# Patient Record
Sex: FEMALE | Race: WHITE | NOT HISPANIC OR LATINO | URBAN - NONMETROPOLITAN AREA
[De-identification: names, ages, dates, MRNs, and addresses within clinical notes are randomized per-mention and may not be internally consistent; named-entity substitution may affect disease eponyms.]

---

## 2017-02-17 ENCOUNTER — OFFICE VISIT (OUTPATIENT)
Dept: RETAIL CLINIC | Facility: CLINIC | Age: 57
End: 2017-02-17

## 2017-02-17 VITALS
TEMPERATURE: 99 F | WEIGHT: 206 LBS | OXYGEN SATURATION: 99 % | HEIGHT: 66 IN | RESPIRATION RATE: 14 BRPM | HEART RATE: 74 BPM | BODY MASS INDEX: 33.11 KG/M2

## 2017-02-17 DIAGNOSIS — J01.00 ACUTE MAXILLARY SINUSITIS, RECURRENCE NOT SPECIFIED: Primary | ICD-10-CM

## 2017-02-17 DIAGNOSIS — J02.9 SORE THROAT: ICD-10-CM

## 2017-02-17 LAB
EXPIRATION DATE: NORMAL
INTERNAL CONTROL: NORMAL
Lab: NORMAL
S PYO AG THROAT QL: NEGATIVE

## 2017-02-17 PROCEDURE — 87880 STREP A ASSAY W/OPTIC: CPT | Performed by: NURSE PRACTITIONER

## 2017-02-17 PROCEDURE — 99213 OFFICE O/P EST LOW 20 MIN: CPT | Performed by: NURSE PRACTITIONER

## 2017-02-17 RX ORDER — LISINOPRIL 10 MG/1
10 TABLET ORAL DAILY
COMMUNITY

## 2017-02-17 RX ORDER — FLUTICASONE PROPIONATE 50 MCG
SPRAY, SUSPENSION (ML) NASAL
Qty: 1 EACH | Refills: 0 | Status: SHIPPED | OUTPATIENT
Start: 2017-02-17 | End: 2018-08-06

## 2017-02-17 RX ORDER — AMOXICILLIN AND CLAVULANATE POTASSIUM 875; 125 MG/1; MG/1
1 TABLET, FILM COATED ORAL 2 TIMES DAILY
Qty: 20 TABLET | Refills: 0 | Status: SHIPPED | OUTPATIENT
Start: 2017-02-17 | End: 2017-02-27

## 2017-02-17 NOTE — PROGRESS NOTES
HPI Comments: Fara presents to the clinic today c/o sore throat which started three to four days ago. Associated symptoms include sinus congestion/facial pain, headache and nausea. Several of her grandchildren have been diagnosed with strep. She has tried Ibuprofen  without adequate relief. Refer to ROS for additional information.    Sore Throat    The current episode started in the past 7 days. The problem has been gradually worsening. The maximum temperature recorded prior to her arrival was 100.4 - 100.9 F. The fever has been present for 1 to 2 days. Associated symptoms include congestion, coughing and ear pain. Pertinent negatives include no abdominal pain, shortness of breath, swollen glands or vomiting. She has had exposure to strep. She has tried NSAIDs for the symptoms. The treatment provided mild relief.      The following portions of the patient's history were reviewed and updated as appropriate: allergies, current medications, past family history, past medical history, past social history, past surgical history and problem list.    Review of Systems   Constitutional: Positive for appetite change, fatigue and fever.   HENT: Positive for congestion, ear pain, postnasal drip, rhinorrhea, sinus pressure and sore throat.    Eyes: Negative for discharge.   Respiratory: Positive for cough. Negative for chest tightness, shortness of breath and wheezing.    Cardiovascular: Negative for chest pain.   Gastrointestinal: Negative for abdominal pain, nausea and vomiting.   Musculoskeletal: Negative for neck stiffness.   Skin: Negative for color change and rash.   Hematological: Positive for adenopathy.   Psychiatric/Behavioral: Positive for sleep disturbance.   All other systems reviewed and are negative.    Physical Exam   Constitutional: She is oriented to person, place, and time. She appears well-developed and well-nourished. She appears ill. No distress.   HENT:   Head: Normocephalic.   Right Ear: Ear canal  normal. There is tenderness. Tympanic membrane is erythematous and bulging. Tympanic membrane is not scarred and not retracted.   Left Ear: Ear canal normal. There is tenderness. Tympanic membrane is erythematous and bulging. Tympanic membrane is not scarred and not retracted.   Nose: Mucosal edema and rhinorrhea present. No epistaxis. Right sinus exhibits maxillary sinus tenderness. Right sinus exhibits no frontal sinus tenderness. Left sinus exhibits maxillary sinus tenderness. Left sinus exhibits no frontal sinus tenderness.   Mouth/Throat: Uvula is midline and mucous membranes are normal. No uvula swelling. Oropharyngeal exudate (PND) and posterior oropharyngeal erythema present. No posterior oropharyngeal edema.   Eyes: Pupils are equal, round, and reactive to light. Right eye exhibits no discharge. Left eye exhibits no discharge.   Neck: Neck supple. No rigidity.   Cardiovascular: Normal rate, regular rhythm and normal heart sounds.    Pulmonary/Chest: Effort normal. No respiratory distress. She has no decreased breath sounds. She has no wheezes. She has no rhonchi. She has no rales.   Abdominal: Soft. Bowel sounds are normal. There is no tenderness. There is no rebound and no guarding.   Lymphadenopathy:     She has no cervical adenopathy.   Neurological: She is alert and oriented to person, place, and time.   Skin: Skin is warm and dry.   Psychiatric: She has a normal mood and affect. Her behavior is normal.   Vitals reviewed.    Assessment/Plan   Fara was seen today for sore throat.    Diagnoses and all orders for this visit:    Acute maxillary sinusitis, recurrence not specified  Comments:  Findings and recommendations discussed with Fara. Supportive care measures reviewed.   Orders:  -     amoxicillin-clavulanate (AUGMENTIN) 875-125 MG per tablet; Take 1 tablet by mouth 2 (Two) Times a Day for 10 days. Take with food  -     fluticasone (FLONASE) 50 MCG/ACT nasal spray; One spray in each nostril twice a  day    Sore throat  Comments:  Findings and recommendations discussed with Fara. Reviewed results of her Strep test which was negative. Supportive care measures reviewed.   Orders:  -     POC Rapid Strep A    Findings and recommendations discussed with Fara. Reviewed results of her Strep test which was negative. Supportive care measures reviewed. Encouraged Fara to seek further medical evaluation if symptoms worsen or do not improve within 48-72 hours.   Lab Results   Component Value Date    IRLANDARN Negative 02/17/2017

## 2017-02-17 NOTE — PATIENT INSTRUCTIONS
Sinusitis, Adult  Sinusitis is redness, soreness, and puffiness (inflammation) of the air pockets in the bones of your face (sinuses). The redness, soreness, and puffiness can cause air and mucus to get trapped in your sinuses. This can allow germs to grow and cause an infection.   HOME CARE   · Drink enough fluids to keep your pee (urine) clear or pale yellow.  · Use a humidifier in your home.  · Run a hot shower to create steam in the bathroom. Sit in the bathroom with the door closed. Breathe in the steam 3-4 times a day.  · Put a warm, moist washcloth on your face 3-4 times a day, or as told by your doctor.  · Use salt water sprays (saline sprays) to wet the thick fluid in your nose. This can help the sinuses drain.  · Only take medicine as told by your doctor.  GET HELP RIGHT AWAY IF:   · Your pain gets worse.  · You have very bad headaches.  · You are sick to your stomach (nauseous).  · You throw up (vomit).  · You are very sleepy (drowsy) all the time.  · Your face is puffy (swollen).  · Your vision changes.  · You have a stiff neck.  · You have trouble breathing.  MAKE SURE YOU:   · Understand these instructions.  · Will watch your condition.  · Will get help right away if you are not doing well or get worse.     This information is not intended to replace advice given to you by your health care provider. Make sure you discuss any questions you have with your health care provider.     Document Released: 06/05/2009 Document Revised: 01/08/2016 Document Reviewed: 10/12/2016  [employer] Interactive Patient Education ©2016 [employer]  Sore Throat  A sore throat is a painful, burning, sore, or scratchy feeling of the throat. There may be pain or tenderness when swallowing or talking. You may have other symptoms with a sore throat. These include coughing, sneezing, fever, or a swollen neck. A sore throat is often the first sign of another sickness. These sicknesses may include a cold, flu, strep throat, or an  infection called mono. Most sore throats go away without medical treatment.   HOME CARE   · Only take medicine as told by your doctor.  · Drink enough fluids to keep your pee (urine) clear or pale yellow.  · Rest as needed.  · Try using throat sprays, lozenges, or suck on hard candy (if older than 4 years or as told).  · Sip warm liquids, such as broth, herbal tea, or warm water with honey. Try sucking on frozen ice pops or drinking cold liquids.  · Rinse the mouth (gargle) with salt water. Mix 1 teaspoon salt with 8 ounces of water.  · Do not smoke. Avoid being around others when they are smoking.  · Put a humidifier in your bedroom at night to moisten the air. You can also turn on a hot shower and sit in the bathroom for 5-10 minutes. Be sure the bathroom door is closed.  GET HELP RIGHT AWAY IF:   · You have trouble breathing.  · You cannot swallow fluids, soft foods, or your spit (saliva).  · You have more puffiness (swelling) in the throat.  · Your sore throat does not get better in 7 days.  · You feel sick to your stomach (nauseous) and throw up (vomit).  · You have a fever or lasting symptoms for more than 2-3 days.  · You have a fever and your symptoms suddenly get worse.  MAKE SURE YOU:   · Understand these instructions.  · Will watch your condition.  · Will get help right away if you are not doing well or get worse.     This information is not intended to replace advice given to you by your health care provider. Make sure you discuss any questions you have with your health care provider.     Document Released: 09/26/2009 Document Revised: 09/11/2013 Document Reviewed: 10/07/2016  [employer] Interactive Patient Education ©2016 [employer]

## 2018-08-06 ENCOUNTER — APPOINTMENT (OUTPATIENT)
Dept: ULTRASOUND IMAGING | Facility: HOSPITAL | Age: 58
End: 2018-08-06

## 2018-08-06 ENCOUNTER — HOSPITAL ENCOUNTER (EMERGENCY)
Facility: HOSPITAL | Age: 58
Discharge: HOME | End: 2018-08-06
Attending: EMERGENCY MEDICINE | Admitting: EMERGENCY MEDICINE

## 2018-08-06 VITALS
OXYGEN SATURATION: 98 % | RESPIRATION RATE: 18 BRPM | BODY MASS INDEX: 29.73 KG/M2 | WEIGHT: 185 LBS | TEMPERATURE: 98.4 F | HEIGHT: 66 IN | HEART RATE: 70 BPM | SYSTOLIC BLOOD PRESSURE: 95 MMHG | DIASTOLIC BLOOD PRESSURE: 61 MMHG

## 2018-08-06 DIAGNOSIS — L03.116 CELLULITIS OF LEFT LOWER LEG: Primary | ICD-10-CM

## 2018-08-06 LAB
ALBUMIN SERPL-MCNC: 4.2 G/DL (ref 3.5–5)
ALBUMIN/GLOB SERPL: 1 G/DL (ref 1.5–2.5)
ALP SERPL-CCNC: 94 U/L (ref 35–104)
ALT SERPL W P-5'-P-CCNC: 28 U/L (ref 10–36)
ANION GAP SERPL CALCULATED.3IONS-SCNC: 9.1 MMOL/L (ref 3.6–11.2)
APTT PPP: 38.4 SECONDS (ref 23.8–36.1)
AST SERPL-CCNC: 29 U/L (ref 10–30)
BASOPHILS # BLD AUTO: 0.01 10*3/MM3 (ref 0–0.3)
BASOPHILS NFR BLD AUTO: 0.2 % (ref 0–2)
BILIRUB SERPL-MCNC: 0.8 MG/DL (ref 0.2–1.8)
BUN BLD-MCNC: 24 MG/DL (ref 7–21)
BUN/CREAT SERPL: 18.9 (ref 7–25)
CALCIUM SPEC-SCNC: 9.6 MG/DL (ref 7.7–10)
CHLORIDE SERPL-SCNC: 99 MMOL/L (ref 99–112)
CO2 SERPL-SCNC: 27.9 MMOL/L (ref 24.3–31.9)
CREAT BLD-MCNC: 1.27 MG/DL (ref 0.43–1.29)
CRP SERPL-MCNC: 3.18 MG/DL (ref 0–0.99)
DEPRECATED RDW RBC AUTO: 42.2 FL (ref 37–54)
EOSINOPHIL # BLD AUTO: 0.06 10*3/MM3 (ref 0–0.7)
EOSINOPHIL NFR BLD AUTO: 1.1 % (ref 0–5)
ERYTHROCYTE [DISTWIDTH] IN BLOOD BY AUTOMATED COUNT: 13.4 % (ref 11.5–14.5)
ERYTHROCYTE [SEDIMENTATION RATE] IN BLOOD: 44 MM/HR (ref 0–30)
GFR SERPL CREATININE-BSD FRML MDRD: 44 ML/MIN/1.73
GLOBULIN UR ELPH-MCNC: 4.1 GM/DL
GLUCOSE BLD-MCNC: 111 MG/DL (ref 70–110)
HCT VFR BLD AUTO: 34.5 % (ref 37–47)
HGB BLD-MCNC: 11.4 G/DL (ref 12–16)
IMM GRANULOCYTES # BLD: 0.03 10*3/MM3 (ref 0–0.03)
IMM GRANULOCYTES NFR BLD: 0.6 % (ref 0–0.5)
INR PPP: 1.17 (ref 0.9–1.1)
LYMPHOCYTES # BLD AUTO: 1.49 10*3/MM3 (ref 1–3)
LYMPHOCYTES NFR BLD AUTO: 28.4 % (ref 21–51)
MCH RBC QN AUTO: 28.4 PG (ref 27–33)
MCHC RBC AUTO-ENTMCNC: 33 G/DL (ref 33–37)
MCV RBC AUTO: 85.8 FL (ref 80–94)
MONOCYTES # BLD AUTO: 0.57 10*3/MM3 (ref 0.1–0.9)
MONOCYTES NFR BLD AUTO: 10.9 % (ref 0–10)
NEUTROPHILS # BLD AUTO: 3.08 10*3/MM3 (ref 1.4–6.5)
NEUTROPHILS NFR BLD AUTO: 58.8 % (ref 30–70)
OSMOLALITY SERPL CALC.SUM OF ELEC: 276.7 MOSM/KG (ref 273–305)
PLATELET # BLD AUTO: 128 10*3/MM3 (ref 130–400)
PMV BLD AUTO: 9.7 FL (ref 6–10)
POTASSIUM BLD-SCNC: 3.2 MMOL/L (ref 3.5–5.3)
PROT SERPL-MCNC: 8.3 G/DL (ref 6–8)
PROTHROMBIN TIME: 15.1 SECONDS (ref 11–15.4)
RBC # BLD AUTO: 4.02 10*6/MM3 (ref 4.2–5.4)
SODIUM BLD-SCNC: 136 MMOL/L (ref 135–153)
WBC NRBC COR # BLD: 5.24 10*3/MM3 (ref 4.5–12.5)

## 2018-08-06 PROCEDURE — 87186 SC STD MICRODIL/AGAR DIL: CPT | Performed by: PHYSICIAN ASSISTANT

## 2018-08-06 PROCEDURE — 93971 EXTREMITY STUDY: CPT | Performed by: RADIOLOGY

## 2018-08-06 PROCEDURE — 25010000002 DALBAVANCIN 500 MG RECONSTITUTED SOLUTION 1 EACH VIAL: Performed by: PHYSICIAN ASSISTANT

## 2018-08-06 PROCEDURE — 80053 COMPREHEN METABOLIC PANEL: CPT | Performed by: PHYSICIAN ASSISTANT

## 2018-08-06 PROCEDURE — 99284 EMERGENCY DEPT VISIT MOD MDM: CPT

## 2018-08-06 PROCEDURE — 85610 PROTHROMBIN TIME: CPT | Performed by: PHYSICIAN ASSISTANT

## 2018-08-06 PROCEDURE — 86140 C-REACTIVE PROTEIN: CPT | Performed by: PHYSICIAN ASSISTANT

## 2018-08-06 PROCEDURE — 93971 EXTREMITY STUDY: CPT

## 2018-08-06 PROCEDURE — 87205 SMEAR GRAM STAIN: CPT | Performed by: PHYSICIAN ASSISTANT

## 2018-08-06 PROCEDURE — 87147 CULTURE TYPE IMMUNOLOGIC: CPT | Performed by: PHYSICIAN ASSISTANT

## 2018-08-06 PROCEDURE — 85730 THROMBOPLASTIN TIME PARTIAL: CPT | Performed by: PHYSICIAN ASSISTANT

## 2018-08-06 PROCEDURE — 87077 CULTURE AEROBIC IDENTIFY: CPT | Performed by: PHYSICIAN ASSISTANT

## 2018-08-06 PROCEDURE — 87040 BLOOD CULTURE FOR BACTERIA: CPT | Performed by: PHYSICIAN ASSISTANT

## 2018-08-06 PROCEDURE — 96365 THER/PROPH/DIAG IV INF INIT: CPT

## 2018-08-06 PROCEDURE — 87070 CULTURE OTHR SPECIMN AEROBIC: CPT | Performed by: PHYSICIAN ASSISTANT

## 2018-08-06 PROCEDURE — 85652 RBC SED RATE AUTOMATED: CPT | Performed by: PHYSICIAN ASSISTANT

## 2018-08-06 PROCEDURE — 85025 COMPLETE CBC W/AUTO DIFF WBC: CPT | Performed by: PHYSICIAN ASSISTANT

## 2018-08-06 RX ORDER — DEXTROSE MONOHYDRATE 50 MG/ML
50 INJECTION, SOLUTION INTRAVENOUS AS NEEDED
Status: DISCONTINUED | OUTPATIENT
Start: 2018-08-06 | End: 2018-08-06 | Stop reason: HOSPADM

## 2018-08-06 RX ORDER — SODIUM CHLORIDE 0.9 % (FLUSH) 0.9 %
10 SYRINGE (ML) INJECTION AS NEEDED
Status: DISCONTINUED | OUTPATIENT
Start: 2018-08-06 | End: 2018-08-06 | Stop reason: HOSPADM

## 2018-08-06 RX ORDER — POTASSIUM CHLORIDE 20 MEQ/1
40 TABLET, EXTENDED RELEASE ORAL ONCE
Status: COMPLETED | OUTPATIENT
Start: 2018-08-06 | End: 2018-08-06

## 2018-08-06 RX ADMIN — DEXTROSE MONOHYDRATE 50 ML: 5 INJECTION, SOLUTION INTRAVENOUS at 18:33

## 2018-08-06 RX ADMIN — POTASSIUM CHLORIDE 40 MEQ: 1500 TABLET, EXTENDED RELEASE ORAL at 18:10

## 2018-08-06 RX ADMIN — DALBAVANCIN 1500 MG: 500 INJECTION, POWDER, FOR SOLUTION INTRAVENOUS at 18:34

## 2018-08-07 NOTE — DISCHARGE INSTRUCTIONS
Elevate legs as much as possible.  Follow up with your family doctor in the next 2 days for a re-evaluation.  Return to the ED if your symptoms worsen.

## 2018-08-07 NOTE — ED PROVIDER NOTES
Subjective   58-year-old female who presents to the ED today for left lower leg pain and redness.  She states she started to feel ill about 3 days ago with generalized weakness and fatigue.  She states 2 days ago she started to have left leg pain and redness with increased swelling.  She denies any known fever but states she has had chills.  She has had nausea but no vomiting.  She states her appetite has been normal.  She states she always has bilateral lower extremity edema.  She states it has been this way for years.  She states she also has chronic venous stasis ulcers to her bilateral lower extremities.  She states she has an ulceration to the left lower leg that has been there for several months and frequently drains.  She states the drainage is a little worse over the last 2 days.        History provided by:  Patient  Lower Extremity Issue   Location:  Leg  Time since incident:  2 days  Injury: no    Leg location:  L lower leg  Pain details:     Quality:  Aching    Radiates to:  Does not radiate    Severity:  Moderate    Onset quality:  Gradual    Duration:  2 days    Timing:  Constant    Progression:  Worsening  Chronicity:  New  Relieved by:  Nothing  Worsened by:  Nothing  Ineffective treatments:  None tried  Associated symptoms: swelling    Associated symptoms: no back pain, no decreased ROM, no fatigue, no fever, no itching, no muscle weakness, no neck pain, no numbness, no stiffness and no tingling        Review of Systems   Constitutional: Negative for fatigue and fever.   HENT: Negative.    Eyes: Negative.    Respiratory: Negative.    Cardiovascular: Positive for leg swelling.   Gastrointestinal: Negative.    Genitourinary: Negative.    Musculoskeletal: Negative for back pain, neck pain and stiffness.   Skin: Positive for color change and wound. Negative for itching.   Neurological: Negative.    Psychiatric/Behavioral: Negative.    All other systems reviewed and are negative.      Past Medical  History:   Diagnosis Date   • Arthritis    • Glaucoma    • Hypertension    • PVD (peripheral vascular disease) (CMS/HCC)    • Vision loss        No Known Allergies    Past Surgical History:   Procedure Laterality Date   • CHOLECYSTECTOMY     • TUBAL ABDOMINAL LIGATION         Family History   Problem Relation Age of Onset   • Heart disease Mother    • Cancer Father    • Heart disease Maternal Grandmother    • Heart disease Maternal Grandfather    • Cancer Paternal Grandmother    • Cancer Paternal Grandfather        Social History     Social History   • Marital status:      Social History Main Topics   • Smoking status: Never Smoker   • Smokeless tobacco: Never Used   • Alcohol use No   • Drug use: No   • Sexual activity: Defer     Other Topics Concern   • Not on file           Objective   Physical Exam   Constitutional: She is oriented to person, place, and time. She appears well-developed and well-nourished. No distress.   HENT:   Head: Normocephalic and atraumatic.   Right Ear: External ear normal.   Left Ear: External ear normal.   Nose: Nose normal.   Mouth/Throat: Oropharynx is clear and moist.   Eyes: Pupils are equal, round, and reactive to light. Conjunctivae and EOM are normal.   Neck: Normal range of motion. Neck supple.   Cardiovascular: Normal rate, regular rhythm, normal heart sounds and intact distal pulses.    Pulmonary/Chest: Effort normal and breath sounds normal.   Abdominal: Soft. Bowel sounds are normal. There is no tenderness. There is no rebound and no guarding.   Musculoskeletal: She exhibits edema and tenderness (left lower leg).   Neurological: She is alert and oriented to person, place, and time.   Skin: Skin is warm and dry. Capillary refill takes less than 2 seconds. There is erythema.   Patient has erythema and warmth to about 2/3 of the left lower leg.  She has a venous stasis ulcer to the left lower leg that has serous drainage.  Pedal pulses are 2+ bilaterally.  She appears to  have lymphedema swelling to both lower extremities which she reports is chronic.  Right leg is not red or hot, does have some mild venous stasis ulcers that do not appear infected at this time.   Psychiatric: She has a normal mood and affect. Her behavior is normal. Judgment and thought content normal.   Nursing note and vitals reviewed.      Procedures           ED Course  ED Course as of Aug 06 2136   Mon Aug 06, 2018   1802 Plan is to give patient a dose of Dalvance and then will discharge patient home to follow up outpatient.  She is not septic, WBC count normal, no fever.  Discusses this plan vs. Admission with the patient and she is agreeable.  [AH]   1820 US venous shows no acute findings per vrad  [AH]      ED Course User Index  [AH] Silvia Medina PA                  [facility]  Number of Diagnoses or Management Options  Cellulitis of left lower leg:      Amount and/or Complexity of Data Reviewed  Clinical lab tests: reviewed  Tests in the radiology section of CPT®: reviewed    Patient Progress  Patient progress: stable        Final diagnoses:   Cellulitis of left lower leg            Silvia Medina PA  08/06/18 2136

## 2018-08-08 LAB
BACTERIA SPEC AEROBE CULT: ABNORMAL
GRAM STN SPEC: ABNORMAL
GRAM STN SPEC: ABNORMAL

## 2018-08-11 LAB
BACTERIA SPEC AEROBE CULT: NORMAL
BACTERIA SPEC AEROBE CULT: NORMAL

## 2018-12-02 ENCOUNTER — OFFICE VISIT (OUTPATIENT)
Dept: RETAIL CLINIC | Facility: CLINIC | Age: 58
End: 2018-12-02

## 2018-12-02 VITALS
TEMPERATURE: 97.8 F | DIASTOLIC BLOOD PRESSURE: 70 MMHG | BODY MASS INDEX: 32.93 KG/M2 | SYSTOLIC BLOOD PRESSURE: 138 MMHG | WEIGHT: 204 LBS | HEART RATE: 80 BPM | OXYGEN SATURATION: 97 % | RESPIRATION RATE: 18 BRPM

## 2018-12-02 DIAGNOSIS — J06.9 URI, ACUTE: Primary | ICD-10-CM

## 2018-12-02 DIAGNOSIS — J06.9 URI, ACUTE: ICD-10-CM

## 2018-12-02 DIAGNOSIS — J02.9 SORE THROAT: ICD-10-CM

## 2018-12-02 LAB
EXPIRATION DATE: NORMAL
INTERNAL CONTROL: NORMAL
Lab: NORMAL
S PYO AG THROAT QL: NEGATIVE

## 2018-12-02 PROCEDURE — 87880 STREP A ASSAY W/OPTIC: CPT | Performed by: NURSE PRACTITIONER

## 2018-12-02 PROCEDURE — 99213 OFFICE O/P EST LOW 20 MIN: CPT | Performed by: NURSE PRACTITIONER

## 2018-12-02 RX ORDER — DEXTROMETHORPHAN HYDROBROMIDE AND PROMETHAZINE HYDROCHLORIDE 15; 6.25 MG/5ML; MG/5ML
5 SYRUP ORAL EVERY 6 HOURS PRN
Qty: 100 ML | Refills: 0 | Status: SHIPPED | OUTPATIENT
Start: 2018-12-02 | End: 2018-12-07

## 2018-12-02 RX ORDER — AMOXICILLIN AND CLAVULANATE POTASSIUM 875; 125 MG/1; MG/1
1 TABLET, FILM COATED ORAL 2 TIMES DAILY
Qty: 20 TABLET | Refills: 0 | Status: SHIPPED | OUTPATIENT
Start: 2018-12-02 | End: 2018-12-12

## 2018-12-02 RX ORDER — FLUTICASONE PROPIONATE 50 MCG
2 SPRAY, SUSPENSION (ML) NASAL DAILY
Qty: 1 BOTTLE | Refills: 0 | Status: SHIPPED | OUTPATIENT
Start: 2018-12-02 | End: 2019-02-12

## 2018-12-02 NOTE — PROGRESS NOTES
[email or web address]@  Fara Esquivel is a 58 y.o. female.   Chief Complaint   Patient presents with   • URI      URI    This is a new problem. Episode onset: 8 days ago. The problem has been gradually worsening. There has been no fever. Associated symptoms include congestion, coughing, headaches, a plugged ear sensation, rhinorrhea, sinus pain, a sore throat and wheezing. Pertinent negatives include no abdominal pain, chest pain, diarrhea, dysuria, ear pain, joint pain, nausea, neck pain, rash or vomiting. She has tried nothing for the symptoms.      The following portions of the patient's history were reviewed and updated as appropriate: allergies, current medications, past family history, past medical history, past social history, past surgical history and problem list.    Current Outpatient Medications:   •  amoxicillin-clavulanate (AUGMENTIN) 875-125 MG per tablet, Take 1 tablet by mouth 2 (Two) Times a Day for 10 days., Disp: 20 tablet, Rfl: 0  •  fluticasone (FLONASE) 50 MCG/ACT nasal spray, 2 sprays into the nostril(s) as directed by provider Daily., Disp: 1 bottle, Rfl: 0  •  ibuprofen (ADVIL,MOTRIN) 200 MG tablet, Take 400 mg by mouth Every 6 (Six) Hours As Needed for Mild Pain ., Disp: , Rfl:   •  lisinopril (PRINIVIL,ZESTRIL) 10 MG tablet, Take 10 mg by mouth Daily., Disp: , Rfl:   •  promethazine-dextromethorphan (PROMETHAZINE-DM) 6.25-15 MG/5ML syrup, Take 5 mL by mouth Every 6 (Six) Hours As Needed for Cough for up to 5 days., Disp: 100 mL, Rfl: 0    No Known Allergies    Review of Systems   Constitutional: Positive for fatigue. Negative for activity change, appetite change, chills and fever.   HENT: Positive for congestion, postnasal drip, rhinorrhea, sinus pressure, sinus pain and sore throat. Negative for ear pain and trouble swallowing.    Eyes: Negative for discharge and itching.   Respiratory: Positive for cough and wheezing. Negative for choking and shortness of breath.    Cardiovascular: Negative  for chest pain.   Gastrointestinal: Negative for abdominal pain, diarrhea, nausea and vomiting.   Endocrine: Negative for cold intolerance.   Genitourinary: Negative for dysuria.   Musculoskeletal: Negative for joint pain, myalgias, neck pain and neck stiffness.   Skin: Negative for color change, pallor and rash.   Allergic/Immunologic: Positive for environmental allergies. Negative for immunocompromised state.   Neurological: Positive for headaches. Negative for dizziness.   Psychiatric/Behavioral: Positive for sleep disturbance.     Objective     Visit Vitals  /70   Pulse 80   Temp 97.8 °F (36.6 °C)   Resp 18   Wt 92.5 kg (204 lb)   SpO2 97%   BMI 32.93 kg/m²       Physical Exam   Constitutional: She is oriented to person, place, and time. She appears well-developed and well-nourished.   HENT:   Head: Normocephalic.   Right Ear: Tympanic membrane is bulging. Tympanic membrane is not injected and not erythematous. A middle ear effusion is present.   Left Ear: Tympanic membrane is bulging. Tympanic membrane is not injected and not erythematous. A middle ear effusion is present.   Nose: Mucosal edema and rhinorrhea present.   Mouth/Throat: Mucous membranes are normal. Posterior oropharyngeal erythema present. No posterior oropharyngeal edema. No tonsillar exudate.   Eyes: Conjunctivae are normal.   Cardiovascular: Normal rate and regular rhythm.   Pulmonary/Chest: Effort normal and breath sounds normal. She has no wheezes.   Abdominal: Soft. Bowel sounds are normal.   Lymphadenopathy:     She has no cervical adenopathy.   Neurological: She is alert and oriented to person, place, and time.   Skin: Skin is warm and dry.   Psychiatric: She has a normal mood and affect. Her behavior is normal.       Lab Results (last 24 hours)     Procedure Component Value Units Date/Time    POC Rapid Strep A [[id number]]  (Normal) Collected:  12/02/18 1441    Specimen:  Swab Updated:  12/02/18 1442     Rapid Strep A Screen Negative      Internal Control Passed     Lot Number [id number]     Expiration Date 2/29/20        Assessment/Plan   Fara was seen today for uri.    Diagnoses and all orders for this visit:    URI, acute  -     amoxicillin-clavulanate (AUGMENTIN) 875-125 MG per tablet; Take 1 tablet by mouth 2 (Two) Times a Day for 10 days.  -     fluticasone (FLONASE) 50 MCG/ACT nasal spray; 2 sprays into the nostril(s) as directed by provider Daily.  -     promethazine-dextromethorphan (PROMETHAZINE-DM) 6.25-15 MG/5ML syrup; Take 5 mL by mouth Every 6 (Six) Hours As Needed for Cough for up to 5 days.    Sore throat  -     POC Rapid Strep A               Discussed PE findings and treatment plan. AVS reviewed with patient, understanding verbalized and agrees with treatment plan.  Follow up with primary care provider if no improvement within next 7-10 days. Go to [facility] or ER if condition worsens.

## 2018-12-14 RX ORDER — FLUTICASONE PROPIONATE 50 MCG
SPRAY, SUSPENSION (ML) NASAL
Qty: 48 ML | Refills: 0 | OUTPATIENT
Start: 2018-12-14

## 2019-01-28 ENCOUNTER — HOSPITAL ENCOUNTER (EMERGENCY)
Facility: HOSPITAL | Age: 59
Discharge: HOME | End: 2019-01-28
Attending: EMERGENCY MEDICINE | Admitting: EMERGENCY MEDICINE

## 2019-01-28 VITALS
BODY MASS INDEX: 29.02 KG/M2 | HEIGHT: 64 IN | DIASTOLIC BLOOD PRESSURE: 68 MMHG | HEART RATE: 71 BPM | SYSTOLIC BLOOD PRESSURE: 129 MMHG | RESPIRATION RATE: 18 BRPM | OXYGEN SATURATION: 98 % | TEMPERATURE: 98 F | WEIGHT: 170 LBS

## 2019-01-28 DIAGNOSIS — I87.2 VENOUS INSUFFICIENCY OF BOTH LOWER EXTREMITIES: ICD-10-CM

## 2019-01-28 DIAGNOSIS — L03.116 CELLULITIS OF LEFT LOWER LEG: Primary | ICD-10-CM

## 2019-01-28 LAB
ALBUMIN SERPL-MCNC: 4.1 G/DL (ref 3.5–5)
ALBUMIN/GLOB SERPL: 1.1 G/DL (ref 1.5–2.5)
ALP SERPL-CCNC: 94 U/L (ref 35–104)
ALT SERPL W P-5'-P-CCNC: 26 U/L (ref 10–36)
ANION GAP SERPL CALCULATED.3IONS-SCNC: 5.2 MMOL/L (ref 3.6–11.2)
AST SERPL-CCNC: 20 U/L (ref 10–30)
BACTERIA UR QL AUTO: ABNORMAL /HPF
BASOPHILS # BLD AUTO: 0.01 10*3/MM3 (ref 0–0.3)
BASOPHILS NFR BLD AUTO: 0.2 % (ref 0–2)
BILIRUB SERPL-MCNC: 0.7 MG/DL (ref 0.2–1.8)
BILIRUB UR QL STRIP: NEGATIVE
BUN BLD-MCNC: 15 MG/DL (ref 7–21)
BUN/CREAT SERPL: 17.9 (ref 7–25)
CALCIUM SPEC-SCNC: 9.4 MG/DL (ref 7.7–10)
CHLORIDE SERPL-SCNC: 103 MMOL/L (ref 99–112)
CLARITY UR: CLEAR
CO2 SERPL-SCNC: 29.8 MMOL/L (ref 24.3–31.9)
COLOR UR: ABNORMAL
CREAT BLD-MCNC: 0.84 MG/DL (ref 0.43–1.29)
CRP SERPL-MCNC: 0.97 MG/DL (ref 0–0.99)
D-LACTATE SERPL-SCNC: 0.9 MMOL/L (ref 0.5–2)
DEPRECATED RDW RBC AUTO: 42.5 FL (ref 37–54)
EOSINOPHIL # BLD AUTO: 0.11 10*3/MM3 (ref 0–0.7)
EOSINOPHIL NFR BLD AUTO: 1.8 % (ref 0–5)
ERYTHROCYTE [DISTWIDTH] IN BLOOD BY AUTOMATED COUNT: 14.1 % (ref 11.5–14.5)
ERYTHROCYTE [SEDIMENTATION RATE] IN BLOOD: 28 MM/HR (ref 0–30)
GFR SERPL CREATININE-BSD FRML MDRD: 70 ML/MIN/1.73
GLOBULIN UR ELPH-MCNC: 3.7 GM/DL
GLUCOSE BLD-MCNC: 110 MG/DL (ref 70–110)
GLUCOSE UR STRIP-MCNC: NEGATIVE MG/DL
HCT VFR BLD AUTO: 33.1 % (ref 37–47)
HGB BLD-MCNC: 10.8 G/DL (ref 12–16)
HGB UR QL STRIP.AUTO: ABNORMAL
HYALINE CASTS UR QL AUTO: ABNORMAL /LPF
IMM GRANULOCYTES # BLD AUTO: 0.03 10*3/MM3 (ref 0–0.03)
IMM GRANULOCYTES NFR BLD AUTO: 0.5 % (ref 0–0.5)
KETONES UR QL STRIP: NEGATIVE
LEUKOCYTE ESTERASE UR QL STRIP.AUTO: NEGATIVE
LYMPHOCYTES # BLD AUTO: 1.57 10*3/MM3 (ref 1–3)
LYMPHOCYTES NFR BLD AUTO: 25.9 % (ref 21–51)
MCH RBC QN AUTO: 27.7 PG (ref 27–33)
MCHC RBC AUTO-ENTMCNC: 32.6 G/DL (ref 33–37)
MCV RBC AUTO: 84.9 FL (ref 80–94)
MONOCYTES # BLD AUTO: 0.51 10*3/MM3 (ref 0.1–0.9)
MONOCYTES NFR BLD AUTO: 8.4 % (ref 0–10)
NEUTROPHILS # BLD AUTO: 3.84 10*3/MM3 (ref 1.4–6.5)
NEUTROPHILS NFR BLD AUTO: 63.2 % (ref 30–70)
NITRITE UR QL STRIP: NEGATIVE
OSMOLALITY SERPL CALC.SUM OF ELEC: 277.1 MOSM/KG (ref 273–305)
PH UR STRIP.AUTO: <=5 [PH] (ref 5–8)
PLATELET # BLD AUTO: 117 10*3/MM3 (ref 130–400)
PMV BLD AUTO: 9 FL (ref 6–10)
POTASSIUM BLD-SCNC: 3.3 MMOL/L (ref 3.5–5.3)
PROT SERPL-MCNC: 7.8 G/DL (ref 6–8)
PROT UR QL STRIP: NEGATIVE
RBC # BLD AUTO: 3.9 10*6/MM3 (ref 4.2–5.4)
RBC # UR: ABNORMAL /HPF
REF LAB TEST METHOD: ABNORMAL
SODIUM BLD-SCNC: 138 MMOL/L (ref 135–153)
SP GR UR STRIP: 1.03 (ref 1–1.03)
SQUAMOUS #/AREA URNS HPF: ABNORMAL /HPF
UROBILINOGEN UR QL STRIP: ABNORMAL
WBC NRBC COR # BLD: 6.07 10*3/MM3 (ref 4.5–12.5)
WBC UR QL AUTO: ABNORMAL /HPF

## 2019-01-28 PROCEDURE — 36415 COLL VENOUS BLD VENIPUNCTURE: CPT

## 2019-01-28 PROCEDURE — 86140 C-REACTIVE PROTEIN: CPT | Performed by: NURSE PRACTITIONER

## 2019-01-28 PROCEDURE — 87186 SC STD MICRODIL/AGAR DIL: CPT | Performed by: NURSE PRACTITIONER

## 2019-01-28 PROCEDURE — 99283 EMERGENCY DEPT VISIT LOW MDM: CPT

## 2019-01-28 PROCEDURE — 87070 CULTURE OTHR SPECIMN AEROBIC: CPT | Performed by: NURSE PRACTITIONER

## 2019-01-28 PROCEDURE — 25010000002 DALBAVANCIN 500 MG RECONSTITUTED SOLUTION 1 EACH VIAL: Performed by: NURSE PRACTITIONER

## 2019-01-28 PROCEDURE — 87147 CULTURE TYPE IMMUNOLOGIC: CPT | Performed by: NURSE PRACTITIONER

## 2019-01-28 PROCEDURE — 87077 CULTURE AEROBIC IDENTIFY: CPT | Performed by: NURSE PRACTITIONER

## 2019-01-28 PROCEDURE — 81001 URINALYSIS AUTO W/SCOPE: CPT | Performed by: NURSE PRACTITIONER

## 2019-01-28 PROCEDURE — 80053 COMPREHEN METABOLIC PANEL: CPT | Performed by: NURSE PRACTITIONER

## 2019-01-28 PROCEDURE — 96365 THER/PROPH/DIAG IV INF INIT: CPT

## 2019-01-28 PROCEDURE — 87040 BLOOD CULTURE FOR BACTERIA: CPT | Performed by: NURSE PRACTITIONER

## 2019-01-28 PROCEDURE — 87205 SMEAR GRAM STAIN: CPT | Performed by: NURSE PRACTITIONER

## 2019-01-28 PROCEDURE — 85025 COMPLETE CBC W/AUTO DIFF WBC: CPT | Performed by: NURSE PRACTITIONER

## 2019-01-28 PROCEDURE — 83605 ASSAY OF LACTIC ACID: CPT | Performed by: NURSE PRACTITIONER

## 2019-01-28 PROCEDURE — 85652 RBC SED RATE AUTOMATED: CPT | Performed by: NURSE PRACTITIONER

## 2019-01-28 RX ORDER — SODIUM CHLORIDE 0.9 % (FLUSH) 0.9 %
10 SYRINGE (ML) INJECTION AS NEEDED
Status: DISCONTINUED | OUTPATIENT
Start: 2019-01-28 | End: 2019-01-28 | Stop reason: HOSPADM

## 2019-01-28 RX ADMIN — DALBAVANCIN 1500 MG: 500 INJECTION, POWDER, FOR SOLUTION INTRAVENOUS at 18:09

## 2019-02-01 LAB
BACTERIA SPEC AEROBE CULT: ABNORMAL
GRAM STN SPEC: ABNORMAL
GRAM STN SPEC: ABNORMAL

## 2019-02-02 LAB
BACTERIA SPEC AEROBE CULT: NORMAL
BACTERIA SPEC AEROBE CULT: NORMAL

## 2019-02-12 ENCOUNTER — OFFICE VISIT (OUTPATIENT)
Dept: SURGERY | Facility: CLINIC | Age: 59
End: 2019-02-12

## 2019-02-12 VITALS
RESPIRATION RATE: 18 BRPM | WEIGHT: 190 LBS | OXYGEN SATURATION: 98 % | SYSTOLIC BLOOD PRESSURE: 134 MMHG | HEART RATE: 80 BPM | TEMPERATURE: 98.4 F | BODY MASS INDEX: 32.44 KG/M2 | DIASTOLIC BLOOD PRESSURE: 88 MMHG | HEIGHT: 64 IN

## 2019-02-12 DIAGNOSIS — I87.2 VENOUS STASIS DERMATITIS OF BOTH LOWER EXTREMITIES: ICD-10-CM

## 2019-02-12 DIAGNOSIS — L03.116 CELLULITIS OF LEFT LOWER EXTREMITY: Primary | ICD-10-CM

## 2019-02-12 PROCEDURE — 99203 OFFICE O/P NEW LOW 30 MIN: CPT | Performed by: SURGERY

## 2019-02-12 NOTE — PROGRESS NOTES
2/12/2019    Patient Information  Fara Esquivel  [address] [place] [address]  1960  [phone] (home)     Chief Complaint   Patient presents with   • Cellulitis     Referred for Cellulitis of Left Lower Limb       HPI  Patient is a 58-year-old white female who is coming to see me because of swelling of her left leg.  She said her leg actually looks much better now than it did a few days ago.  It was markedly red and swollen and draining.  She has obvious bilateral venous stasis disease with venous stasis dermatitis and now some early venous stasis ulcerations especially on the left side.      The Past medical history, family history, social history, medication list, allergies, and Review of Systems has been reviewed and confirmed.      General: negative  Integumentary: draining abscess  Eyes: eyesight problems  ENT: negative  Respiratory: negative  Gastrointestinal: negative  Cardiovascular: negative  Neurological: negative  Psychiatric: negative  Hematologic/Lymphatic: negative  Genitourinary: negative  Musculoskeletal: negative  Endocrine: negative  Breasts: negative      There is no problem list on file for this patient.        Past Medical History:   Diagnosis Date   • Arthritis    • Glaucoma    • Hypertension    • PVD (peripheral vascular disease) (CMS/HCC)    • Vision loss          Past Surgical History:   Procedure Laterality Date   • CHOLECYSTECTOMY     • TUBAL ABDOMINAL LIGATION           Family History   Problem Relation Age of Onset   • Heart disease Mother    • Cancer Father    • Heart disease Maternal Grandmother    • Heart disease Maternal Grandfather    • Cancer Paternal Grandmother    • Cancer Paternal Grandfather          Social History     Tobacco Use   • Smoking status: Never Smoker   • Smokeless tobacco: Never Used   Substance Use Topics   • Alcohol use: No   • Drug use: No       Current Outpatient Medications   Medication Sig Dispense Refill   • lisinopril (PRINIVIL,ZESTRIL) 10  "MG tablet Take 10 mg by mouth Daily.       No current facility-administered medications for this visit.          Allergies  Patient has no known allergies.    /88   Pulse 80   Temp 98.4 °F (36.9 °C) (Oral)   Resp 18   Ht 162.6 cm (64.02\")   Wt 86.2 kg (190 lb)   SpO2 98%   BMI 32.60 kg/m²      Physical Exam    General :  Patient is a 58 y.o. female in no acute distress.     left leg shows early ulcerations and redness consistent with a cellulitis.  She has marked swelling of his left leg.  Right leg there is findings of chronic venous stasis disease         ASSESSMENTBilateral venous stasis disease and cellulitis left leg         I have recommended to the patient that she be admitted to the hospital for IV antibiotics.  Patient is refusing to do this because of social situations.  She is to return in 2 days and continue on her antibiotics.  She is to keep dry dressings on her leg.    Patient's Body mass index is 32.6 kg/m². BMI is above normal parameters. Recommendations include: educational material.           This document signed by Jeramy Munoz MD February 12, 2019 1:24 PM   "

## 2019-02-15 ENCOUNTER — OFFICE VISIT (OUTPATIENT)
Dept: SURGERY | Facility: CLINIC | Age: 59
End: 2019-02-15

## 2019-02-15 VITALS
BODY MASS INDEX: 32.44 KG/M2 | WEIGHT: 190 LBS | HEART RATE: 78 BPM | TEMPERATURE: 98.1 F | DIASTOLIC BLOOD PRESSURE: 92 MMHG | SYSTOLIC BLOOD PRESSURE: 140 MMHG | RESPIRATION RATE: 17 BRPM | HEIGHT: 64 IN | OXYGEN SATURATION: 98 %

## 2019-02-15 DIAGNOSIS — L03.116 CELLULITIS OF LEFT LOWER EXTREMITY: ICD-10-CM

## 2019-02-15 DIAGNOSIS — I87.2 VENOUS STASIS DERMATITIS OF BOTH LOWER EXTREMITIES: Primary | ICD-10-CM

## 2019-02-15 PROCEDURE — 29580 STRAPPING UNNA BOOT: CPT | Performed by: SURGERY

## 2019-02-15 PROCEDURE — 99213 OFFICE O/P EST LOW 20 MIN: CPT | Performed by: SURGERY

## 2019-02-15 NOTE — PROGRESS NOTES
2/15/2019    Patient Information  Fara Esquivel  [address] [place] [address]  1960  [phone] (home)     Chief Complaint   Patient presents with   • Follow-up     F/U Cellulitis Left Leg       HPI  Patient's 58 or white female with resolving cellulitis left leg but obvious venous stasis ulceration.  I saw her 2 days ago recommended hospitalization for cellulitis.  She refused because of social situations is been on oral antibiotics.  Cellulitis has SHE still has ulcerations    Review of Systems    The Past medical history, family history, social history, medication list, allergies, and Review of Systems has been reviewed and confirmed.      General: negative  Integumentary: draining abscess  Eyes: eyesight problems  ENT: negative  Respiratory: negative  Gastrointestinal: negative  Cardiovascular: negative  Neurological: negative  Psychiatric: negative  Hematologic/Lymphatic: negative  Genitourinary: negative  Musculoskeletal: negative  Endocrine: negative  Breasts: negative      There is no problem list on file for this patient.        Past Medical History:   Diagnosis Date   • Arthritis    • Glaucoma    • Hypertension    • PVD (peripheral vascular disease) (CMS/HCC)    • Vision loss          Past Surgical History:   Procedure Laterality Date   • CHOLECYSTECTOMY     • TUBAL ABDOMINAL LIGATION           Family History   Problem Relation Age of Onset   • Heart disease Mother    • Cancer Father    • Heart disease Maternal Grandmother    • Heart disease Maternal Grandfather    • Cancer Paternal Grandmother    • Cancer Paternal Grandfather          Social History     Tobacco Use   • Smoking status: Never Smoker   • Smokeless tobacco: Never Used   Substance Use Topics   • Alcohol use: No   • Drug use: No       Current Outpatient Medications   Medication Sig Dispense Refill   • lisinopril (PRINIVIL,ZESTRIL) 10 MG tablet Take 10 mg by mouth Daily.       No current facility-administered medications for this  "visit.          Allergies  Patient has no known allergies.    /92   Pulse 78   Temp 98.1 °F (36.7 °C) (Oral)   Resp 17   Ht 162.6 cm (64.02\")   Wt 86.2 kg (190 lb)   SpO2 98%   BMI 32.60 kg/m²      Physical Exam    General :  Patient is a 58 y.o. female in no acute distress.    HEENT:  Normocephalic, pupils equally round and reactive to light, extraocular motions intact.  Chest:  Clear bilaterally. Equal breath sounds. No rales or rhonchi.  Heart:   Regular rate and rhythm.  Abdomen:  Soft, nontender. No distention.  :  Normal external genitalia.  Rectal:  Not performed.  Extremities:  No clubbing cyanosis or edema. Good femoral, popliteal, dorsalis pedis and posterior tibial pulse.  Neurologic:  Patient oriented ×3. Cranial nerves grossly intact. No sensory,cellebellar, or motor dysfunction.      Ulcerations left lower extremity    Left lower extremity Unna boot placed        ASSESSMENT  Venous stasis ulceration left leg        PLAN    Return 1 week for Unna boot change    Patient's Body mass index is 32.6 kg/m². BMI is above normal parameters. Recommendations include: educational material.           This document signed by Jeramy Munoz MD February 15, 2019 1:48 PM   "

## 2019-03-06 ENCOUNTER — TELEPHONE (OUTPATIENT)
Dept: SURGERY | Facility: CLINIC | Age: 59
End: 2019-03-06

## 2019-03-06 NOTE — TELEPHONE ENCOUNTER
I tried calling the patient to see how her leg is doing and if she needs to come back in to be seen. She has missed her last 3 appts and we had placed her in an unna boot. There was no answer so I left a message to call us back. I called her EC which is her son and he said he would tell her to call us.

## 2019-03-15 ENCOUNTER — OFFICE VISIT (OUTPATIENT)
Dept: SURGERY | Facility: CLINIC | Age: 59
End: 2019-03-15

## 2019-03-15 ENCOUNTER — APPOINTMENT (OUTPATIENT)
Dept: GENERAL RADIOLOGY | Facility: HOSPITAL | Age: 59
End: 2019-03-15

## 2019-03-15 ENCOUNTER — HOSPITAL ENCOUNTER (EMERGENCY)
Facility: HOSPITAL | Age: 59
Discharge: HOME | End: 2019-03-15
Attending: EMERGENCY MEDICINE | Admitting: EMERGENCY MEDICINE

## 2019-03-15 VITALS
RESPIRATION RATE: 16 BRPM | OXYGEN SATURATION: 98 % | HEIGHT: 65 IN | HEART RATE: 98 BPM | DIASTOLIC BLOOD PRESSURE: 60 MMHG | SYSTOLIC BLOOD PRESSURE: 138 MMHG | WEIGHT: 170 LBS | BODY MASS INDEX: 28.32 KG/M2 | TEMPERATURE: 98.3 F

## 2019-03-15 VITALS
HEIGHT: 64 IN | BODY MASS INDEX: 32.44 KG/M2 | RESPIRATION RATE: 17 BRPM | HEART RATE: 84 BPM | SYSTOLIC BLOOD PRESSURE: 125 MMHG | OXYGEN SATURATION: 98 % | DIASTOLIC BLOOD PRESSURE: 84 MMHG | WEIGHT: 190 LBS | TEMPERATURE: 98.3 F

## 2019-03-15 DIAGNOSIS — S99.922A FOOT INJURY, LEFT, INITIAL ENCOUNTER: ICD-10-CM

## 2019-03-15 DIAGNOSIS — S96.912A MUSCLE STRAIN OF LEFT FOOT, INITIAL ENCOUNTER: Primary | ICD-10-CM

## 2019-03-15 DIAGNOSIS — I87.2 VENOUS STASIS DERMATITIS OF BOTH LOWER EXTREMITIES: Primary | ICD-10-CM

## 2019-03-15 PROCEDURE — 99283 EMERGENCY DEPT VISIT LOW MDM: CPT

## 2019-03-15 PROCEDURE — 73630 X-RAY EXAM OF FOOT: CPT

## 2019-03-15 PROCEDURE — 73630 X-RAY EXAM OF FOOT: CPT | Performed by: RADIOLOGY

## 2019-03-15 PROCEDURE — 99213 OFFICE O/P EST LOW 20 MIN: CPT | Performed by: SURGERY

## 2019-03-15 RX ORDER — IBUPROFEN 400 MG/1
800 TABLET ORAL ONCE
Status: COMPLETED | OUTPATIENT
Start: 2019-03-15 | End: 2019-03-15

## 2019-03-15 RX ADMIN — IBUPROFEN 800 MG: 400 TABLET, FILM COATED ORAL at 15:52

## 2019-03-15 NOTE — PROGRESS NOTES
3/15/2019    Patient Information  Fara Esquivel  [address] [place] [address]  1960  [phone] (home)     Chief Complaint   Patient presents with   • Follow-up     F/U UNNA BOOT CHANGE LEFT LEG       HPI  Patient is a 58-year-old white female was here a month ago.  We placed an Unna boot on her left leg.  She was supposed to come back a week later, but did not return.  She took the Unna boot off herself.  Today she is complaining of dropping a very heavy can on the dorsum of her left foot.  She is having lots of pain and tenderness and can hardly walk    Review of Systems    The Past medical history, family history, social history, medication list, allergies, and Review of Systems has been reviewed and confirmed.      General: negative  Integumentary: draining abscess  Eyes: eyesight problems  ENT: negative  Respiratory: negative  Gastrointestinal: negative  Cardiovascular: negative  Neurological: negative  Psychiatric: negative  Hematologic/Lymphatic: negative  Genitourinary: negative  Musculoskeletal: painful joints, sore muscles, joint swelling, back pain and joint stiffness  Endocrine: negative  Breasts: negative      There is no problem list on file for this patient.        Past Medical History:   Diagnosis Date   • Arthritis    • Glaucoma    • Hypertension    • PVD (peripheral vascular disease) (CMS/HCC)    • Vision loss          Past Surgical History:   Procedure Laterality Date   • CHOLECYSTECTOMY     • TUBAL ABDOMINAL LIGATION           Family History   Problem Relation Age of Onset   • Heart disease Mother    • Cancer Father    • Heart disease Maternal Grandmother    • Heart disease Maternal Grandfather    • Cancer Paternal Grandmother    • Cancer Paternal Grandfather          Social History     Tobacco Use   • Smoking status: Never Smoker   • Smokeless tobacco: Never Used   Substance Use Topics   • Alcohol use: No   • Drug use: No       Current Outpatient Medications   Medication Sig  "Dispense Refill   • lisinopril (PRINIVIL,ZESTRIL) 10 MG tablet Take 10 mg by mouth Daily.       No current facility-administered medications for this visit.          Allergies  Patient has no known allergies.    /84   Pulse 84   Temp 98.3 °F (36.8 °C) (Oral)   Resp 17   Ht 162.6 cm (64.02\")   Wt 86.2 kg (190 lb)   SpO2 98%   BMI 32.60 kg/m²      Physical Exam    General :  Patient is a 58 y.o. female in no acute distress.    HEENT:  Normocephalic, pupils equally round and reactive to light, extraocular motions intact.  Chest:  Clear bilaterally. Equal breath sounds. No rales or rhonchi.  Heart:   Regular rate and rhythm.  Abdomen:  Soft, nontender. No distention.  :  Normal external genitalia.  Rectal:  Not performed.  Extremities:  Left leg with evidence of venous stasis dermatitis.  Her left foot is markedly swollen with cyanosis of the distal toes..  Neurologic:  Patient oriented ×3. Cranial nerves grossly intact. No sensory,cellebellar, or motor dysfunction.                ASSESSMENT  Trauma to left foot  Venous stasis disease        PLAN    We are transporting her to the emergency room for further workup of this foot trauma.    Patient's Body mass index is 32.6 kg/m². BMI is above normal parameters. Recommendations include: educational material.           This document signed by Jeramy Munoz MD March 15, 2019 2:40 PM   "

## 2019-03-15 NOTE — ED PROVIDER NOTES
Subjective     Lower Extremity Issue   Location:  Foot  Time since incident:  1 day  Injury: yes    Mechanism of injury comment:  Dropped a large can of veggies on her foot  Foot location:  L foot  Pain details:     Quality:  Aching    Radiates to:  Does not radiate    Severity:  Moderate    Onset quality:  Gradual    Duration:  1 day    Timing:  Constant    Progression:  Worsening  Chronicity:  New  Dislocation: no    Foreign body present:  No foreign bodies  Tetanus status:  Up to date  Prior injury to area:  No  Relieved by:  None tried  Worsened by:  Bearing weight and activity  Ineffective treatments:  None tried  Associated symptoms: decreased ROM, stiffness and swelling    Associated symptoms: no back pain, no fatigue, no fever, no itching, no muscle weakness, no neck pain, no numbness and no tingling    Risk factors: no concern for non-accidental trauma, no frequent fractures, no known bone disorder, no obesity and no recent illness        Review of Systems   Constitutional: Negative.  Negative for fatigue and fever.   HENT: Negative.    Respiratory: Negative.    Cardiovascular: Negative.  Negative for chest pain.   Gastrointestinal: Negative.  Negative for abdominal pain.   Endocrine: Negative.    Genitourinary: Negative.  Negative for dysuria.   Musculoskeletal: Positive for stiffness. Negative for arthralgias, back pain, gait problem, joint swelling, myalgias, neck pain and neck stiffness.        Left foot injury    Skin: Negative.  Negative for itching.   Neurological: Negative.    Psychiatric/Behavioral: Negative.    All other systems reviewed and are negative.      Past Medical History:   Diagnosis Date   • Arthritis    • Glaucoma    • Hypertension    • PVD (peripheral vascular disease) (CMS/HCC)    • Vision loss        No Known Allergies    Past Surgical History:   Procedure Laterality Date   • CHOLECYSTECTOMY     • TUBAL ABDOMINAL LIGATION         Family History   Problem Relation Age of Onset   •  Heart disease Mother    • Cancer Father    • Heart disease Maternal Grandmother    • Heart disease Maternal Grandfather    • Cancer Paternal Grandmother    • Cancer Paternal Grandfather        Social History     Socioeconomic History   • Marital status:      Spouse name: Not on file   • Number of children: Not on file   • Years of education: Not on file   • Highest education level: Not on file   Tobacco Use   • Smoking status: Never Smoker   • Smokeless tobacco: Never Used   Substance and Sexual Activity   • Alcohol use: No   • Drug use: No   • Sexual activity: Defer           Objective   Physical Exam   Constitutional: She is oriented to person, place, and time. She appears well-developed and well-nourished. No distress.   HENT:   Head: Normocephalic and atraumatic.   Right Ear: External ear normal.   Left Ear: External ear normal.   Nose: Nose normal.   Eyes: Conjunctivae and EOM are normal. Pupils are equal, round, and reactive to light.   Neck: Normal range of motion. Neck supple. No JVD present. No tracheal deviation present.   Cardiovascular: Normal rate, regular rhythm and normal heart sounds.   No murmur heard.  Pulmonary/Chest: Effort normal and breath sounds normal. No respiratory distress. She has no wheezes.   Abdominal: Soft. Bowel sounds are normal. There is no tenderness.   Musculoskeletal: Normal range of motion. She exhibits edema and tenderness. She exhibits no deformity.   Left foot edema and discoloration that is chronic appearing and is chronic per the patient; tenderness to palpation in the metatarsals. Neurovascular status and sensation LLE intact.    Neurological: She is alert and oriented to person, place, and time. She displays normal reflexes. No cranial nerve deficit or sensory deficit. She exhibits normal muscle tone. Coordination normal.   Skin: Skin is warm and dry. No rash noted. She is not diaphoretic. No erythema. No pallor.   Psychiatric: She has a normal mood and affect.  Her behavior is normal. Thought content normal.   Nursing note and vitals reviewed.      Procedures           ED Course  ED Course as of Mar 15 1606   Fri Mar 15, 2019   1605 Patient diagnosed with left foot strain. Will be d/c home with instructions to rest, ice, elevate and use ibuprofen. Will f/u with PCP in 2 days or return to ER if symptoms worsen.   [MM]      ED Course User Index  [MM] Jessica Miller PA                  MDM  Number of Diagnoses or Management Options  Muscle strain of left foot, initial encounter:      Amount and/or Complexity of Data Reviewed  Tests in the radiology section of CPT®: ordered and reviewed  Discuss the patient with other providers: yes          Final diagnoses:   Muscle strain of left foot, initial encounter            Jessica Miller PA  03/15/19 1604

## 2020-11-18 NOTE — PATIENT INSTRUCTIONS
"Upper Respiratory Infection, Adult  Most upper respiratory infections (URIs) are a viral infection of the air passages leading to the lungs. A URI affects the nose, throat, and upper air passages. The most common type of URI is nasopharyngitis and is typically referred to as \"the common cold.\"  URIs run their course and usually go away on their own. Most of the time, a URI does not require medical attention, but sometimes a bacterial infection in the upper airways can follow a viral infection. This is called a secondary infection. Sinus and middle ear infections are common types of secondary upper respiratory infections.  Bacterial pneumonia can also complicate a URI. A URI can worsen asthma and chronic obstructive pulmonary disease (COPD). Sometimes, these complications can require emergency medical care and may be life threatening.  What are the causes?  Almost all URIs are caused by viruses. A virus is a type of germ and can spread from one person to another.  What increases the risk?  You may be at risk for a URI if:  · You smoke.  · You have chronic heart or lung disease.  · You have a weakened defense (immune) system.  · You are very young or very old.  · You have nasal allergies or asthma.  · You work in crowded or poorly ventilated areas.  · You work in health care facilities or schools.    What are the signs or symptoms?  Symptoms typically develop 2-3 days after you come in contact with a cold virus. Most viral URIs last 7-10 days. However, viral URIs from the influenza virus (flu virus) can last 14-18 days and are typically more severe. Symptoms may include:  · Runny or stuffy (congested) nose.  · Sneezing.  · Cough.  · Sore throat.  · Headache.  · Fatigue.  · Fever.  · Loss of appetite.  · Pain in your forehead, behind your eyes, and over your cheekbones (sinus pain).  · Muscle aches.    How is this diagnosed?  Your health care provider may diagnose a URI by:  · Physical exam.  · Tests to check that your " symptoms are not due to another condition such as:  ? Strep throat.  ? Sinusitis.  ? Pneumonia.  ? Asthma.    How is this treated?  A URI goes away on its own with time. It cannot be cured with medicines, but medicines may be prescribed or recommended to relieve symptoms. Medicines may help:  · Reduce your fever.  · Reduce your cough.  · Relieve nasal congestion.    Follow these instructions at home:  · Take medicines only as directed by your health care provider.  · Gargle warm saltwater or take cough drops to comfort your throat as directed by your health care provider.  · Use a warm mist humidifier or inhale steam from a shower to increase air moisture. This may make it easier to breathe.  · Drink enough fluid to keep your urine clear or pale yellow.  · Eat soups and other clear broths and maintain good nutrition.  · Rest as needed.  · Return to work when your temperature has returned to normal or as your health care provider advises. You may need to stay home longer to avoid infecting others. You can also use a face mask and careful hand washing to prevent spread of the virus.  · Increase the usage of your inhaler if you have asthma.  · Do not use any tobacco products, including cigarettes, chewing tobacco, or electronic cigarettes. If you need help quitting, ask your health care provider.  How is this prevented?  The best way to protect yourself from getting a cold is to practice good hygiene.  · Avoid oral or hand contact with people with cold symptoms.  · Wash your hands often if contact occurs.    There is no clear evidence that vitamin C, vitamin E, echinacea, or exercise reduces the chance of developing a cold. However, it is always recommended to get plenty of rest, exercise, and practice good nutrition.  Contact a health care provider if:  · You are getting worse rather than better.  · Your symptoms are not controlled by medicine.  · You have chills.  · You have worsening shortness of breath.  · You have  brown or red mucus.  · You have yellow or brown nasal discharge.  · You have pain in your face, especially when you bend forward.  · You have a fever.  · You have swollen neck glands.  · You have pain while swallowing.  · You have white areas in the back of your throat.  Get help right away if:  · You have severe or persistent:  ? Headache.  ? Ear pain.  ? Sinus pain.  ? Chest pain.  · You have chronic lung disease and any of the following:  ? Wheezing.  ? Prolonged cough.  ? Coughing up blood.  ? A change in your usual mucus.  · You have a stiff neck.  · You have changes in your:  ? Vision.  ? Hearing.  ? Thinking.  ? Mood.  This information is not intended to replace advice given to you by your health care provider. Make sure you discuss any questions you have with your health care provider.  Document Released: 06/13/2002 Document Revised: 08/20/2017 Document Reviewed: 03/25/2015  [employer] Interactive Patient Education © 2018 [employer]     steady